# Patient Record
Sex: FEMALE | Race: WHITE | NOT HISPANIC OR LATINO | Employment: STUDENT | URBAN - METROPOLITAN AREA
[De-identification: names, ages, dates, MRNs, and addresses within clinical notes are randomized per-mention and may not be internally consistent; named-entity substitution may affect disease eponyms.]

---
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
## 2022-10-12 PROBLEM — H61.23 EXCESSIVE CERUMEN IN EAR CANAL, BILATERAL: Status: RESOLVED | Noted: 2021-04-16 | Resolved: 2022-10-12

## 2022-10-13 ENCOUNTER — VBI (OUTPATIENT)
Dept: ADMINISTRATIVE | Facility: OTHER | Age: 22
End: 2022-10-13